# Patient Record
Sex: FEMALE | Race: BLACK OR AFRICAN AMERICAN | NOT HISPANIC OR LATINO
[De-identification: names, ages, dates, MRNs, and addresses within clinical notes are randomized per-mention and may not be internally consistent; named-entity substitution may affect disease eponyms.]

---

## 2017-09-14 ENCOUNTER — APPOINTMENT (OUTPATIENT)
Dept: SURGERY | Facility: CLINIC | Age: 67
End: 2017-09-14
Payer: MEDICARE

## 2017-09-14 VITALS
OXYGEN SATURATION: 97 % | HEIGHT: 65 IN | BODY MASS INDEX: 34.66 KG/M2 | WEIGHT: 208 LBS | DIASTOLIC BLOOD PRESSURE: 91 MMHG | SYSTOLIC BLOOD PRESSURE: 173 MMHG | HEART RATE: 66 BPM | TEMPERATURE: 97.5 F

## 2017-09-14 PROCEDURE — 99213 OFFICE O/P EST LOW 20 MIN: CPT

## 2017-09-14 RX ORDER — LISINOPRIL 30 MG/1
TABLET ORAL
Refills: 0 | Status: ACTIVE | COMMUNITY

## 2017-09-14 RX ORDER — PANTOPRAZOLE 40 MG/1
40 TABLET, DELAYED RELEASE ORAL DAILY
Qty: 30 | Refills: 0 | Status: ACTIVE | OUTPATIENT
Start: 2017-09-14

## 2017-09-26 ENCOUNTER — APPOINTMENT (OUTPATIENT)
Dept: SURGERY | Facility: CLINIC | Age: 67
End: 2017-09-26

## 2017-10-06 ENCOUNTER — INPATIENT (INPATIENT)
Facility: HOSPITAL | Age: 67
LOS: 1 days | Discharge: HOME | DRG: 395 | End: 2017-10-08
Attending: SURGERY | Admitting: SURGERY
Payer: MEDICARE

## 2017-10-06 VITALS
HEIGHT: 65 IN | HEART RATE: 101 BPM | DIASTOLIC BLOOD PRESSURE: 87 MMHG | RESPIRATION RATE: 18 BRPM | OXYGEN SATURATION: 100 % | SYSTOLIC BLOOD PRESSURE: 177 MMHG | TEMPERATURE: 97 F | WEIGHT: 197.98 LBS

## 2017-10-06 DIAGNOSIS — Z98.890 OTHER SPECIFIED POSTPROCEDURAL STATES: Chronic | ICD-10-CM

## 2017-10-06 PROCEDURE — 99285 EMERGENCY DEPT VISIT HI MDM: CPT | Mod: 25

## 2017-10-06 PROCEDURE — 71010: CPT | Mod: 26

## 2017-10-06 PROCEDURE — 93010 ELECTROCARDIOGRAM REPORT: CPT

## 2017-10-06 PROCEDURE — 74000: CPT | Mod: 26

## 2017-10-06 RX ORDER — SODIUM CHLORIDE 9 MG/ML
1000 INJECTION, SOLUTION INTRAVENOUS
Qty: 0 | Refills: 0 | Status: DISCONTINUED | OUTPATIENT
Start: 2017-10-06 | End: 2017-10-08

## 2017-10-06 RX ORDER — LISINOPRIL 2.5 MG/1
0 TABLET ORAL
Qty: 0 | Refills: 0 | COMMUNITY

## 2017-10-06 RX ORDER — SODIUM CHLORIDE 9 MG/ML
1000 INJECTION INTRAMUSCULAR; INTRAVENOUS; SUBCUTANEOUS ONCE
Qty: 0 | Refills: 0 | Status: COMPLETED | OUTPATIENT
Start: 2017-10-06 | End: 2017-10-06

## 2017-10-06 RX ORDER — OMEPRAZOLE 10 MG/1
0 CAPSULE, DELAYED RELEASE ORAL
Qty: 0 | Refills: 0 | COMMUNITY

## 2017-10-06 RX ORDER — ONDANSETRON 8 MG/1
4 TABLET, FILM COATED ORAL ONCE
Qty: 0 | Refills: 0 | Status: COMPLETED | OUTPATIENT
Start: 2017-10-06 | End: 2017-10-06

## 2017-10-06 RX ORDER — IOHEXOL 300 MG/ML
50 INJECTION, SOLUTION INTRAVENOUS ONCE
Qty: 0 | Refills: 0 | Status: COMPLETED | OUTPATIENT
Start: 2017-10-06 | End: 2017-10-06

## 2017-10-06 RX ORDER — ONDANSETRON 8 MG/1
4 TABLET, FILM COATED ORAL EVERY 6 HOURS
Qty: 0 | Refills: 0 | Status: DISCONTINUED | OUTPATIENT
Start: 2017-10-06 | End: 2017-10-08

## 2017-10-06 RX ORDER — HEPARIN SODIUM 5000 [USP'U]/ML
5000 INJECTION INTRAVENOUS; SUBCUTANEOUS EVERY 8 HOURS
Qty: 0 | Refills: 0 | Status: DISCONTINUED | OUTPATIENT
Start: 2017-10-06 | End: 2017-10-08

## 2017-10-06 RX ORDER — PANTOPRAZOLE SODIUM 20 MG/1
40 TABLET, DELAYED RELEASE ORAL ONCE
Qty: 0 | Refills: 0 | Status: COMPLETED | OUTPATIENT
Start: 2017-10-06 | End: 2017-10-06

## 2017-10-06 RX ADMIN — SODIUM CHLORIDE 1000 MILLILITER(S): 9 INJECTION INTRAMUSCULAR; INTRAVENOUS; SUBCUTANEOUS at 16:06

## 2017-10-06 RX ADMIN — ONDANSETRON 4 MILLIGRAM(S): 8 TABLET, FILM COATED ORAL at 16:07

## 2017-10-06 RX ADMIN — IOHEXOL 50 MILLILITER(S): 300 INJECTION, SOLUTION INTRAVENOUS at 19:13

## 2017-10-06 RX ADMIN — PANTOPRAZOLE SODIUM 40 MILLIGRAM(S): 20 TABLET, DELAYED RELEASE ORAL at 16:07

## 2017-10-06 RX ADMIN — HEPARIN SODIUM 5000 UNIT(S): 5000 INJECTION INTRAVENOUS; SUBCUTANEOUS at 23:00

## 2017-10-06 NOTE — PROGRESS NOTE ADULT - SUBJECTIVE AND OBJECTIVE BOX
GENERAL SURGERY CONSULT NOTE    HPI:      SURGERY ADDENDUM:     PAST MEDICAL & SURGICAL HISTORY:  HTN (hypertension)  H/O gastric bypass      PAST SURGICAL HISTORY:     REVIEW OF SYSTEMS:   Pertinent positives/negatives noted in HPI.     HOME MEDICATIONS:    ALLERGIES:  No Known Allergies    SOCIAL HISTORY:  Non-smoker. No EtOH or drug use.     FAMILY HISTORY:  NC    Vital Signs Last 24 Hrs  T(C): 36.6 (06 Oct 2017 16:12), Max: 36.6 (06 Oct 2017 16:12)  T(F): 97.8 (06 Oct 2017 16:12), Max: 97.8 (06 Oct 2017 16:12)  HR: 62 (06 Oct 2017 16:12) (62 - 101)  BP: 161/67 (06 Oct 2017 16:12) (161/67 - 177/87)  BP(mean): --  RR: 18 (06 Oct 2017 16:12) (18 - 18)  SpO2: 100% (06 Oct 2017 16:12) (100% - 100%)    PHYSICAL EXAM:  NEURO: A&Ox3, NAD, CALDERON.  CV: RRR, S1&S2.   PULM: CTAB, no increased WOB.  ABD: Obese, soft, ND, NT. Gastric band port palpated in epigastrium.   EXTR: WWP. No peripheral edema.    LABS:                        14.2   6.9   )-----------( 241      ( 06 Oct 2017 14:46 )             40.7     10-06    141  |  99  |  17  ----------------------------<  82  4.9   |  23  |  0.96    Ca    10.2      06 Oct 2017 14:46    TPro  8.5<H>  /  Alb  4.7  /  TBili  0.8  /  DBili  x   /  AST  28  /  ALT  10  /  AlkPhos  63  10-06 GENERAL SURGERY CONSULT NOTE    HPI:  66F    SURGERY ADDENDUM:     PAST MEDICAL HISTORY:  HTN   GERD  Dysphagia    PAST SURGICAL HISTORY:   Gastric banding 2008 at [facility]    REVIEW OF SYSTEMS:   Pertinent positives/negatives noted in HPI.     HOME MEDICATIONS:    ALLERGIES:  No Known Allergies    SOCIAL HISTORY:  Non-smoker. No EtOH or drug use.     FAMILY HISTORY:  NC    Vital Signs Last 24 Hrs  T(C): 36.6 (06 Oct 2017 16:12), Max: 36.6 (06 Oct 2017 16:12)  T(F): 97.8 (06 Oct 2017 16:12), Max: 97.8 (06 Oct 2017 16:12)  HR: 62 (06 Oct 2017 16:12) (62 - 101)  BP: 161/67 (06 Oct 2017 16:12) (161/67 - 177/87)  BP(mean): --  RR: 18 (06 Oct 2017 16:12) (18 - 18)  SpO2: 100% (06 Oct 2017 16:12) (100% - 100%)    PHYSICAL EXAM:  NEURO: A&Ox3, NAD, CALDERON.  CV: RRR, S1&S2.   PULM: CTAB, no increased WOB.  ABD: Obese, soft, ND, NT. Gastric band port palpated in epigastrium.   EXTR: WWP. No peripheral edema.    LABS:                        14.2   6.9   )-----------( 241      ( 06 Oct 2017 14:46 )             40.7     10-06    141  |  99  |  17  ----------------------------<  82  4.9   |  23  |  0.96    Ca    10.2      06 Oct 2017 14:46    TPro  8.5<H>  /  Alb  4.7  /  TBili  0.8  /  DBili  x   /  AST  28  /  ALT  10  /  AlkPhos  63  10-06

## 2017-10-06 NOTE — ED PROVIDER NOTE - GASTROINTESTINAL, MLM
Abd soft, NT, ND, NABS. No guarding, rebound, or rigidity. No pulsatile abdominal masses. No organomegaly appreciated. gastric band palpated in epigastric region

## 2017-10-06 NOTE — ED ADULT TRIAGE NOTE - CHIEF COMPLAINT QUOTE
"I was in [place] and for about a week and while I was there I couldn't eat or drink anything, I kept throwing up. I have pain in my chest and stomach. I had a gastric band done about 9 years ago."

## 2017-10-06 NOTE — ED PROVIDER NOTE - PROGRESS NOTE DETAILS
Surgery consulted and will see the pt Surgery in the ED to evaluate the pt Surgery awaiting senior surgical resident to take fluid out of band and eval if this alleviates sx Pt still awaiting surgery for fluid removal from gastric band. Re-called surgery, they are in surgical emergency and hope to be down shortly pt for admission to surgery for obs. May take PO if not vomiting. 7mL fluid removed from gastric band. surgery requesting upper GI series

## 2017-10-06 NOTE — ED PROVIDER NOTE - MEDICAL DECISION MAKING DETAILS
Pt p/w inability to swallow both liquids and solids, w/ associated chest / abd discomfort. DDx includes esophageal spasm. esophageal stricture / achalasia, food bolus / impaction, complication of gastric band / slippage, other pathology. Pt likely needs esophagram. Will consult surgery for recommendations. IVF, NPO, sx relief. Dispo pending w/u and clinical status

## 2017-10-06 NOTE — ED PROVIDER NOTE - OBJECTIVE STATEMENT
Pt w/ PMHx HTN, HLD, hiatal hernia, diverticulosis, PSHx gastric band 2008 w/ Dr Tabares, brain larissa biopsy, SAH, IVC filter p/w 3 weeks of vomiting w/ decreasing ability to tolerate PO. Pt reports the sx began w/ inability to tolerate hard solid foods. The sx have progressed to inability to swallow liquids or tolerate any PO. Pt reports when she attempts to put anything in her stomach, she gets pain in the chest, which pt cannot describe. The pain is non radiating. The  pt reports it feels the food / liquid gets stuck and then comes back up. Pt reports associated discomfort in the upper abdomen after eating. Pt states she can no longer tolerate any PO, including meds. No f/c. No back pain. Last BM several days ago because she has not been eating. + halitosis. + obstipation. Pt went to Dr Tabares's office approx 2 weeks ago, seen by staff there who attempted to access the band port and drain fluid, w/o success. Pt was referred to have an Esophagram, which is scheduled for this coming Tuesday. Sx have worsened, so pt decided to come to the ED. Pt has been taking Zantac w/ mild relief of sx. Pt believes she had an esophagram 2 years ago, negative. No hx EGD. No known hx esophageal strictures, achalasia, food bolus, or Meckel's diverticulum. No hx CAD.

## 2017-10-06 NOTE — PROGRESS NOTE ADULT - ASSESSMENT
66F s/p lap band placement in 2008 who presents w/ 66F s/p lap band placement in 2008 who presbbordents w/

## 2017-10-06 NOTE — ED PROVIDER NOTE - DIAGNOSTIC INTERPRETATION
ER Physician: Selma Fuchs, DO  CHEST XRAY INTERPRETATION: lungs clear, heart shadow normal, bony structures intact

## 2017-10-06 NOTE — ED ADULT NURSE NOTE - OBJECTIVE STATEMENT
65 y/o female presenting to ER c/o epigastric pain since 3 week ago, pt reports being  in [place] and hoping symptoms will go away. Pt report being unable to eat or drink without feeling nausea and epigastric pain, accompanied with vomiting

## 2017-10-06 NOTE — ED PROVIDER NOTE - CARE PLAN
Principal Discharge DX:	Vomiting, intractability of vomiting not specified, presence of nausea not specified, unspecified vomiting type  Secondary Diagnosis:	Gastric band slippage

## 2017-10-07 ENCOUNTER — TRANSCRIPTION ENCOUNTER (OUTPATIENT)
Age: 67
End: 2017-10-07

## 2017-10-07 DIAGNOSIS — Z98.890 OTHER SPECIFIED POSTPROCEDURAL STATES: Chronic | ICD-10-CM

## 2017-10-07 DIAGNOSIS — Z98.84 BARIATRIC SURGERY STATUS: Chronic | ICD-10-CM

## 2017-10-07 RX ORDER — ONDANSETRON 8 MG/1
4 TABLET, FILM COATED ORAL ONCE
Qty: 0 | Refills: 0 | Status: COMPLETED | OUTPATIENT
Start: 2017-10-07 | End: 2017-10-07

## 2017-10-07 RX ADMIN — ONDANSETRON 4 MILLIGRAM(S): 8 TABLET, FILM COATED ORAL at 07:46

## 2017-10-07 RX ADMIN — HEPARIN SODIUM 5000 UNIT(S): 5000 INJECTION INTRAVENOUS; SUBCUTANEOUS at 23:14

## 2017-10-07 RX ADMIN — ONDANSETRON 4 MILLIGRAM(S): 8 TABLET, FILM COATED ORAL at 15:11

## 2017-10-07 RX ADMIN — HEPARIN SODIUM 5000 UNIT(S): 5000 INJECTION INTRAVENOUS; SUBCUTANEOUS at 06:46

## 2017-10-07 RX ADMIN — HEPARIN SODIUM 5000 UNIT(S): 5000 INJECTION INTRAVENOUS; SUBCUTANEOUS at 15:10

## 2017-10-07 NOTE — DISCHARGE NOTE ADULT - CARE PLAN
Principal Discharge DX:	Gastric band slippage  Goal:	Recovery  Instructions for follow-up, activity and diet:	Please resume all regular home medications unless specifically advised not to take a particular medication. Also, please take any new medications as prescribed.  Please get plenty of rest, continue to ambulate several times per day, and drink adequate amounts of fluids. Avoid lifting weights greater than 5-10 lbs until you follow-up with your surgeon, who will instruct you further regarding activity restrictions.  Avoid driving or operating heavy machinery while taking pain medications.  Please follow-up with your surgeon and Primary Care Provider (PCP) as advised.  Incision Care:  *Please call your doctor or nurse practitioner if you have increased pain, swelling, redness, or drainage from the incision site.  *Avoid swimming and baths until your follow-up appointment.  *Call Dr. Tabares's office for a followup appointment this week.

## 2017-10-07 NOTE — H&P ADULT - NSHPLABSRESULTS_GEN_ALL_CORE
14.2   6.9   )-----------( 241      ( 06 Oct 2017 14:46 )             40.7   10-06    141  |  99  |  17  ----------------------------<  82  4.9   |  23  |  0.96    Ca    10.2      06 Oct 2017 14:46    TPro  8.5<H>  /  Alb  4.7  /  TBili  0.8  /  DBili  x   /  AST  28  /  ALT  10  /  AlkPhos  63  10-06

## 2017-10-07 NOTE — H&P ADULT - ASSESSMENT
66F s/p gastric banding in 2008 who presented to [facility] ED w/ N/V & PO intolerance x 3wks    -Now s/p drainage of port w/ improvement in nausea and passage of contrast on abdominal x-ray   -Will stay overnight for observation & PO challenge   -Admit to [facility]  -Pain/nausea control PRN  -Regular diet, IVF  -SQH, SCDs, OOB/A, IS  -AM labs

## 2017-10-07 NOTE — DISCHARGE NOTE ADULT - PATIENT PORTAL LINK FT
“You can access the [facility] Patient Portal, offered by [facility], by registering with the following website: [email or web address]”

## 2017-10-07 NOTE — H&P ADULT - NSHPPHYSICALEXAM_GEN_ALL_CORE
NEURO: A&Ox3, NAD, CALDERON.  CV: RRR, S1&S2.   PULM: CTAB, no increased WOB.  ABD: Obese, soft, ND, NT. Gastric band port palpable in epigastrium.   EXTR: WWP. No peripheral edema.

## 2017-10-07 NOTE — DISCHARGE NOTE ADULT - PLAN OF CARE
Recovery Please resume all regular home medications unless specifically advised not to take a particular medication. Also, please take any new medications as prescribed.  Please get plenty of rest, continue to ambulate several times per day, and drink adequate amounts of fluids. Avoid lifting weights greater than 5-10 lbs until you follow-up with your surgeon, who will instruct you further regarding activity restrictions.  Avoid driving or operating heavy machinery while taking pain medications.  Please follow-up with your surgeon and Primary Care Provider (PCP) as advised.  Incision Care:  *Please call your doctor or nurse practitioner if you have increased pain, swelling, redness, or drainage from the incision site.  *Avoid swimming and baths until your follow-up appointment.  *Call Dr. Tabares's office for a followup appointment this week.

## 2017-10-07 NOTE — H&P ADULT - PMH
Brain mass    Diverticulosis    Hiatal hernia    HTN (hypertension)    Hyperlipidemia    SAH (subarachnoid hemorrhage)

## 2017-10-07 NOTE — PATIENT PROFILE ADULT. - NS TRANSFER PATIENT BELONGINGS
Cell Phone/PDA (specify)/Clothing/Wrist Watch/Jewelry/Money (specify) Wrist Watch/Other belongings/jay jay, neckalce, wallet (at least $200)/Electronic Device (specify)/Jewelry/Money (specify)/Cell Phone/PDA (specify)/Clothing

## 2017-10-07 NOTE — H&P ADULT - HISTORY OF PRESENT ILLNESS
66F w/ HTN, HLD who underwent gastric banding at [facility] in 2008 who presented to [facility] ED 10/6/17 w/ 3 wks of nausea/vomiting. Her PO intolerance progressed from difficulty w/ solid foods to inability to swallow liquids too. Also reports a feeling of food gettign stuck in her chest then refluxing back up. Denies fevers, chills, or changes in bowel function.     In ED, gastric band port was drained of all remaining saline, 7cc. Subsequent abdominal x-ray after drinking contrast (upper GI series not available overnight due to unavailability of fluoro) showed passage of contrast into the stomach and small bowel.

## 2017-10-07 NOTE — DISCHARGE NOTE ADULT - MEDICATION SUMMARY - MEDICATIONS TO TAKE
I will START or STAY ON the medications listed below when I get home from the hospital:    lisinopril  -- Indication: For Home med    omeprazole  -- Indication: For Home med

## 2017-10-07 NOTE — DISCHARGE NOTE ADULT - HOSPITAL COURSE
66F s/p gastric banding in 2008 who presented to [facility] ED w/ N/V & PO intolerance x 3wks. Patient was able to tolerate PO during her hospital stay. UGI showed normal passage of PO intake into stomach and small bowel. Gastric band was removed of saline. Patient's clinical status improved through hospital stay. Patient was hemodynamically stable, afebrile, pain adequately controlled, and with a plan for folllowup upon discharge.

## 2017-10-08 VITALS
DIASTOLIC BLOOD PRESSURE: 73 MMHG | SYSTOLIC BLOOD PRESSURE: 122 MMHG | TEMPERATURE: 97 F | OXYGEN SATURATION: 98 % | HEART RATE: 50 BPM | RESPIRATION RATE: 16 BRPM

## 2017-10-08 NOTE — PROGRESS NOTE ADULT - ASSESSMENT
66F s/p gastric banding in 2008 who presented to [facility] ED w/ N/V & PO intolerance x 3wks    -Discharge today  -Admit to [facility]  -Pain/nausea control PRN  -Regular diet, IVF  -SQH, SCDs, OOB/A, IS  -AM labs

## 2017-10-12 DIAGNOSIS — E78.5 HYPERLIPIDEMIA, UNSPECIFIED: ICD-10-CM

## 2017-10-12 DIAGNOSIS — Z28.21 IMMUNIZATION NOT CARRIED OUT BECAUSE OF PATIENT REFUSAL: ICD-10-CM

## 2017-10-12 DIAGNOSIS — I10 ESSENTIAL (PRIMARY) HYPERTENSION: ICD-10-CM

## 2017-10-12 DIAGNOSIS — K95.09 OTHER COMPLICATIONS OF GASTRIC BAND PROCEDURE: ICD-10-CM

## 2017-10-12 DIAGNOSIS — K57.90 DIVERTICULOSIS OF INTESTINE, PART UNSPECIFIED, WITHOUT PERFORATION OR ABSCESS WITHOUT BLEEDING: ICD-10-CM

## 2017-10-12 DIAGNOSIS — K44.9 DIAPHRAGMATIC HERNIA WITHOUT OBSTRUCTION OR GANGRENE: ICD-10-CM

## 2017-10-12 DIAGNOSIS — Z98.84 BARIATRIC SURGERY STATUS: ICD-10-CM

## 2017-10-12 DIAGNOSIS — Z87.891 PERSONAL HISTORY OF NICOTINE DEPENDENCE: ICD-10-CM

## 2017-10-13 ENCOUNTER — APPOINTMENT (OUTPATIENT)
Dept: SURGERY | Facility: CLINIC | Age: 67
End: 2017-10-13

## 2017-10-17 DIAGNOSIS — Z28.89 IMMUNIZATION NOT CARRIED OUT FOR OTHER REASON: ICD-10-CM

## 2017-12-19 PROCEDURE — 84484 ASSAY OF TROPONIN QUANT: CPT

## 2017-12-19 PROCEDURE — 82550 ASSAY OF CK (CPK): CPT

## 2017-12-19 PROCEDURE — 96374 THER/PROPH/DIAG INJ IV PUSH: CPT

## 2017-12-19 PROCEDURE — 83690 ASSAY OF LIPASE: CPT

## 2017-12-19 PROCEDURE — 71045 X-RAY EXAM CHEST 1 VIEW: CPT

## 2017-12-19 PROCEDURE — 80053 COMPREHEN METABOLIC PANEL: CPT

## 2017-12-19 PROCEDURE — 93005 ELECTROCARDIOGRAM TRACING: CPT

## 2017-12-19 PROCEDURE — 85025 COMPLETE CBC W/AUTO DIFF WBC: CPT

## 2017-12-19 PROCEDURE — 96375 TX/PRO/DX INJ NEW DRUG ADDON: CPT

## 2017-12-19 PROCEDURE — 74000: CPT

## 2017-12-19 PROCEDURE — 82553 CREATINE MB FRACTION: CPT

## 2017-12-19 PROCEDURE — 99285 EMERGENCY DEPT VISIT HI MDM: CPT | Mod: 25

## 2018-01-10 PROBLEM — I10 ESSENTIAL (PRIMARY) HYPERTENSION: Chronic | Status: ACTIVE | Noted: 2017-10-06

## 2018-01-12 ENCOUNTER — APPOINTMENT (OUTPATIENT)
Dept: SURGERY | Facility: CLINIC | Age: 68
End: 2018-01-12
Payer: MEDICARE

## 2018-01-12 VITALS
HEART RATE: 66 BPM | WEIGHT: 220 LBS | HEIGHT: 65 IN | BODY MASS INDEX: 36.65 KG/M2 | SYSTOLIC BLOOD PRESSURE: 184 MMHG | DIASTOLIC BLOOD PRESSURE: 104 MMHG

## 2018-01-12 DIAGNOSIS — R13.10 DYSPHAGIA, UNSPECIFIED: ICD-10-CM

## 2018-01-12 DIAGNOSIS — Z98.84 BARIATRIC SURGERY STATUS: ICD-10-CM

## 2018-01-12 DIAGNOSIS — K21.9 GASTRO-ESOPHAGEAL REFLUX DISEASE W/OUT ESOPHAGITIS: ICD-10-CM

## 2018-01-12 PROCEDURE — 99213 OFFICE O/P EST LOW 20 MIN: CPT

## 2018-05-15 ENCOUNTER — APPOINTMENT (OUTPATIENT)
Dept: SURGERY | Facility: CLINIC | Age: 68
End: 2018-05-15

## 2019-12-11 PROBLEM — K57.90 DIVERTICULOSIS OF INTESTINE, PART UNSPECIFIED, WITHOUT PERFORATION OR ABSCESS WITHOUT BLEEDING: Chronic | Status: ACTIVE | Noted: 2017-10-07

## 2019-12-11 PROBLEM — G93.9 DISORDER OF BRAIN, UNSPECIFIED: Chronic | Status: ACTIVE | Noted: 2017-10-07

## 2019-12-11 PROBLEM — I60.9 NONTRAUMATIC SUBARACHNOID HEMORRHAGE, UNSPECIFIED: Chronic | Status: ACTIVE | Noted: 2017-10-07

## 2019-12-11 PROBLEM — K44.9 DIAPHRAGMATIC HERNIA WITHOUT OBSTRUCTION OR GANGRENE: Chronic | Status: ACTIVE | Noted: 2017-10-07

## 2019-12-11 PROBLEM — E78.5 HYPERLIPIDEMIA, UNSPECIFIED: Chronic | Status: ACTIVE | Noted: 2017-10-07

## 2020-01-21 ENCOUNTER — APPOINTMENT (OUTPATIENT)
Dept: SURGERY | Facility: CLINIC | Age: 70
End: 2020-01-21

## 2020-02-26 NOTE — ED ADULT NURSE NOTE - CAS DISCH CONDITION
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
Detail Level: Simple
Price (Do Not Change): 0.00
Instructions: This plan will send the code FBSD to the PM system.  DO NOT or CHANGE the price.
Stable

## 2025-02-24 ENCOUNTER — APPOINTMENT (OUTPATIENT)
Dept: SURGERY | Facility: CLINIC | Age: 75
End: 2025-02-24
Payer: MEDICARE

## 2025-02-24 VITALS
HEART RATE: 108 BPM | SYSTOLIC BLOOD PRESSURE: 157 MMHG | TEMPERATURE: 97.6 F | BODY MASS INDEX: 37.65 KG/M2 | OXYGEN SATURATION: 100 % | DIASTOLIC BLOOD PRESSURE: 84 MMHG | WEIGHT: 226 LBS | HEIGHT: 65 IN

## 2025-02-24 DIAGNOSIS — E66.01 MORBID (SEVERE) OBESITY DUE TO EXCESS CALORIES: ICD-10-CM

## 2025-02-24 DIAGNOSIS — Z98.84 BARIATRIC SURGERY STATUS: ICD-10-CM

## 2025-02-24 DIAGNOSIS — K21.9 GASTRO-ESOPHAGEAL REFLUX DISEASE W/OUT ESOPHAGITIS: ICD-10-CM

## 2025-02-24 PROCEDURE — 99204 OFFICE O/P NEW MOD 45 MIN: CPT

## 2025-02-24 RX ORDER — TIRZEPATIDE 2.5 MG/.5ML
2.5 INJECTION, SOLUTION SUBCUTANEOUS
Qty: 2 | Refills: 3 | Status: ACTIVE | COMMUNITY
Start: 2025-02-24 | End: 1900-01-01

## 2025-03-17 ENCOUNTER — APPOINTMENT (OUTPATIENT)
Dept: SURGERY | Facility: CLINIC | Age: 75
End: 2025-03-17
Payer: MEDICARE

## 2025-03-17 ENCOUNTER — RESULT REVIEW (OUTPATIENT)
Age: 75
End: 2025-03-17

## 2025-03-17 VITALS
DIASTOLIC BLOOD PRESSURE: 85 MMHG | WEIGHT: 226 LBS | HEIGHT: 65 IN | TEMPERATURE: 97.3 F | HEART RATE: 86 BPM | SYSTOLIC BLOOD PRESSURE: 154 MMHG | BODY MASS INDEX: 37.65 KG/M2 | OXYGEN SATURATION: 100 %

## 2025-03-17 DIAGNOSIS — Z98.84 BARIATRIC SURGERY STATUS: ICD-10-CM

## 2025-03-17 DIAGNOSIS — E66.01 MORBID (SEVERE) OBESITY DUE TO EXCESS CALORIES: ICD-10-CM

## 2025-03-17 DIAGNOSIS — K21.9 GASTRO-ESOPHAGEAL REFLUX DISEASE W/OUT ESOPHAGITIS: ICD-10-CM

## 2025-03-17 PROCEDURE — 99213 OFFICE O/P EST LOW 20 MIN: CPT

## 2025-03-17 RX ORDER — SEMAGLUTIDE 0.25 MG/.5ML
0.25 INJECTION, SOLUTION SUBCUTANEOUS
Qty: 4 | Refills: 3 | Status: ACTIVE | COMMUNITY
Start: 2025-03-17 | End: 1900-01-01

## 2025-05-19 DIAGNOSIS — E66.01 MORBID (SEVERE) OBESITY DUE TO EXCESS CALORIES: ICD-10-CM

## 2025-05-19 DIAGNOSIS — Z98.84 BARIATRIC SURGERY STATUS: ICD-10-CM

## 2025-05-19 RX ORDER — SEMAGLUTIDE 0.5 MG/.5ML
0.5 INJECTION, SOLUTION SUBCUTANEOUS
Qty: 1 | Refills: 3 | Status: ACTIVE | COMMUNITY
Start: 2025-05-19 | End: 1900-01-01

## 2025-06-03 DIAGNOSIS — Z98.84 BARIATRIC SURGERY STATUS: ICD-10-CM

## 2025-06-03 DIAGNOSIS — E66.01 MORBID (SEVERE) OBESITY DUE TO EXCESS CALORIES: ICD-10-CM

## 2025-06-03 RX ORDER — TIRZEPATIDE 5 MG/.5ML
5 INJECTION, SOLUTION SUBCUTANEOUS
Qty: 3 | Refills: 3 | Status: ACTIVE | COMMUNITY
Start: 2025-06-03 | End: 1900-01-01

## 2025-06-03 RX ORDER — TIRZEPATIDE 2.5 MG/.5ML
2.5 INJECTION, SOLUTION SUBCUTANEOUS
Qty: 2 | Refills: 3 | Status: ACTIVE | COMMUNITY
Start: 2025-06-03 | End: 1900-01-01

## 2025-06-16 ENCOUNTER — APPOINTMENT (OUTPATIENT)
Dept: SURGERY | Facility: CLINIC | Age: 75
End: 2025-06-16
Payer: MEDICARE

## 2025-06-16 VITALS
DIASTOLIC BLOOD PRESSURE: 74 MMHG | BODY MASS INDEX: 35.48 KG/M2 | HEART RATE: 98 BPM | SYSTOLIC BLOOD PRESSURE: 155 MMHG | WEIGHT: 212.96 LBS | HEIGHT: 65 IN | OXYGEN SATURATION: 98 %

## 2025-06-16 PROCEDURE — 99214 OFFICE O/P EST MOD 30 MIN: CPT

## 2025-06-16 RX ORDER — DOCUSATE SODIUM 100 MG/1
100 CAPSULE ORAL TWICE DAILY
Qty: 60 | Refills: 3 | Status: ACTIVE | COMMUNITY
Start: 2025-06-16 | End: 1900-01-01

## 2025-06-16 RX ORDER — TIRZEPATIDE 2.5 MG/.5ML
2.5 INJECTION, SOLUTION SUBCUTANEOUS
Qty: 2 | Refills: 3 | Status: ACTIVE | COMMUNITY
Start: 2025-06-16 | End: 1900-01-01

## 2025-09-15 ENCOUNTER — APPOINTMENT (OUTPATIENT)
Dept: SURGERY | Facility: CLINIC | Age: 75
End: 2025-09-15
Payer: MEDICARE

## 2025-09-15 VITALS
DIASTOLIC BLOOD PRESSURE: 88 MMHG | TEMPERATURE: 97.3 F | HEIGHT: 65 IN | WEIGHT: 219 LBS | SYSTOLIC BLOOD PRESSURE: 191 MMHG | BODY MASS INDEX: 36.49 KG/M2 | HEART RATE: 82 BPM | OXYGEN SATURATION: 100 %

## 2025-09-15 DIAGNOSIS — K21.9 GASTRO-ESOPHAGEAL REFLUX DISEASE W/OUT ESOPHAGITIS: ICD-10-CM

## 2025-09-15 DIAGNOSIS — E66.01 MORBID (SEVERE) OBESITY DUE TO EXCESS CALORIES: ICD-10-CM

## 2025-09-15 DIAGNOSIS — Z98.84 BARIATRIC SURGERY STATUS: ICD-10-CM

## 2025-09-15 PROCEDURE — 99214 OFFICE O/P EST MOD 30 MIN: CPT

## 2025-09-15 RX ORDER — SEMAGLUTIDE 1 MG/.5ML
1 INJECTION, SOLUTION SUBCUTANEOUS
Qty: 1 | Refills: 3 | Status: ACTIVE | COMMUNITY
Start: 2025-09-15 | End: 1900-01-01